# Patient Record
Sex: MALE | Race: ASIAN | Employment: UNEMPLOYED | URBAN - METROPOLITAN AREA
[De-identification: names, ages, dates, MRNs, and addresses within clinical notes are randomized per-mention and may not be internally consistent; named-entity substitution may affect disease eponyms.]

---

## 2018-09-27 ENCOUNTER — HOSPITAL ENCOUNTER (EMERGENCY)
Facility: HOSPITAL | Age: 58
Discharge: HOME | End: 2018-09-27
Attending: EMERGENCY MEDICINE
Payer: COMMERCIAL

## 2018-09-27 ENCOUNTER — APPOINTMENT (OUTPATIENT)
Dept: GENERAL RADIOLOGY | Facility: HOSPITAL | Age: 58
End: 2018-09-27
Payer: COMMERCIAL

## 2018-09-27 VITALS
HEART RATE: 81 BPM | BODY MASS INDEX: 28.63 KG/M2 | RESPIRATION RATE: 17 BRPM | HEIGHT: 70 IN | OXYGEN SATURATION: 98 % | WEIGHT: 200 LBS | SYSTOLIC BLOOD PRESSURE: 140 MMHG | TEMPERATURE: 99 F | DIASTOLIC BLOOD PRESSURE: 98 MMHG

## 2018-09-27 DIAGNOSIS — R07.9 CHEST PAIN WITH LOW RISK FOR CARDIAC ETIOLOGY: Primary | ICD-10-CM

## 2018-09-27 PROCEDURE — 36415 COLL VENOUS BLD VENIPUNCTURE: CPT

## 2018-09-27 PROCEDURE — 80053 COMPREHEN METABOLIC PANEL: CPT | Performed by: EMERGENCY MEDICINE

## 2018-09-27 PROCEDURE — 93005 ELECTROCARDIOGRAM TRACING: CPT

## 2018-09-27 PROCEDURE — 99285 EMERGENCY DEPT VISIT HI MDM: CPT

## 2018-09-27 PROCEDURE — 93010 ELECTROCARDIOGRAM REPORT: CPT

## 2018-09-27 PROCEDURE — 85025 COMPLETE CBC W/AUTO DIFF WBC: CPT

## 2018-09-27 PROCEDURE — 71045 X-RAY EXAM CHEST 1 VIEW: CPT

## 2018-09-27 PROCEDURE — 84484 ASSAY OF TROPONIN QUANT: CPT

## 2018-09-27 PROCEDURE — 80053 COMPREHEN METABOLIC PANEL: CPT

## 2018-09-27 PROCEDURE — 85025 COMPLETE CBC W/AUTO DIFF WBC: CPT | Performed by: EMERGENCY MEDICINE

## 2018-09-27 PROCEDURE — 84484 ASSAY OF TROPONIN QUANT: CPT | Performed by: EMERGENCY MEDICINE

## 2018-09-27 NOTE — ED INITIAL ASSESSMENT (HPI)
Pt reports midsternal chest discomfort over the past week. Seen at urgent care last week but pain has not resolved. Visiting here from [facility], hx of htn and diabetes. Pain does not occur with activity, most often occurs after meals.

## 2018-09-27 NOTE — ED PROVIDER NOTES
Patient Seen in: [facility] Emergency Department    History   Patient presents with:  Chest Pain Angina (cardiovascular)    Stated Complaint: chest pain    HPI    53-year-old male who presents here to the emergency department complaining of having s Pupils are equal reactive to light. Extra ocular motions are intact. No scleral icterus or conjunctival pallor: Neck is supple without tenderness on palpation. Head is atraumatic normocephalic. Oral mucosa moist.  Tongue is midline.   No posterior phary (cpt=71045)    Result Date: 9/27/2018  CONCLUSION:  No active disease seen.      Dictated by: Renita Londono MD on 9/27/2018 at 10:51     Approved by: Renita Londono MD          Xr Chest Ap Portable  (cpt=71045)      MDM   The patient's EDAC score was 14

## (undated) NOTE — ED AVS SNAPSHOT
Alissa Hopkins   MRN: [id number]    Department:  [facility] Emergency Department   Date of Visit:  9/27/2018           Disclosure     Insurance plans vary and the physician(s) referred by the ER may not be covered by your plan.  Please contact tell this physician (or your personal doctor if your instructions are to return to your personal doctor) about any new or lasting problems. The primary care or specialist physician will see patients referred from the [facility] Emergency Department.  Abby Farmer